# Patient Record
Sex: FEMALE | Race: WHITE | NOT HISPANIC OR LATINO | Employment: FULL TIME | URBAN - METROPOLITAN AREA
[De-identification: names, ages, dates, MRNs, and addresses within clinical notes are randomized per-mention and may not be internally consistent; named-entity substitution may affect disease eponyms.]

---

## 2019-09-24 ENCOUNTER — OFFICE VISIT (OUTPATIENT)
Dept: PSYCHIATRY | Facility: CLINIC | Age: 44
End: 2019-09-24

## 2019-09-24 DIAGNOSIS — F33.1 MODERATE RECURRENT MAJOR DEPRESSION (HCC): Primary | ICD-10-CM

## 2019-09-24 DIAGNOSIS — F41.1 GENERALIZED ANXIETY DISORDER: ICD-10-CM

## 2019-09-24 PROCEDURE — 90792 PSYCH DIAG EVAL W/MED SRVCS: CPT | Performed by: PHYSICIAN ASSISTANT

## 2019-09-24 RX ORDER — METAXALONE 800 MG/1
TABLET ORAL
COMMUNITY
End: 2020-10-05

## 2019-09-24 RX ORDER — METFORMIN HYDROCHLORIDE EXTENDED-RELEASE TABLETS 1000 MG/1
TABLET, FILM COATED, EXTENDED RELEASE ORAL
COMMUNITY
End: 2020-10-05

## 2019-09-24 RX ORDER — VALSARTAN 80 MG/1
TABLET ORAL
COMMUNITY
Start: 2019-09-20 | End: 2020-10-05

## 2019-09-24 RX ORDER — VILAZODONE HYDROCHLORIDE 20 MG/1
TABLET ORAL
COMMUNITY
End: 2019-09-24

## 2019-09-24 RX ORDER — ROSUVASTATIN CALCIUM 10 MG/1
TABLET, COATED ORAL
COMMUNITY
Start: 2019-09-20 | End: 2020-10-05

## 2019-09-24 RX ORDER — ATORVASTATIN CALCIUM 10 MG/1
TABLET, FILM COATED ORAL
COMMUNITY
End: 2019-09-24 | Stop reason: ALTCHOICE

## 2019-09-24 RX ORDER — LOSARTAN POTASSIUM 25 MG/1
TABLET ORAL
COMMUNITY
End: 2019-09-24 | Stop reason: ALTCHOICE

## 2019-09-24 RX ORDER — LORAZEPAM 0.5 MG/1
0.5 TABLET ORAL 2 TIMES DAILY PRN
Qty: 60 TABLET | Refills: 2 | Status: SHIPPED | OUTPATIENT
Start: 2019-09-24 | End: 2019-11-19 | Stop reason: SINTOL

## 2019-09-24 RX ORDER — CLORAZEPATE DIPOTASSIUM 7.5 MG/1
TABLET ORAL
Refills: 0 | COMMUNITY
Start: 2019-08-07 | End: 2019-09-24 | Stop reason: ALTCHOICE

## 2019-09-24 RX ORDER — VORTIOXETINE 20 MG/1
TABLET, FILM COATED ORAL
COMMUNITY
Start: 2019-07-23 | End: 2020-01-02 | Stop reason: SDUPTHER

## 2019-09-24 RX ORDER — SEMAGLUTIDE 1.34 MG/ML
INJECTION, SOLUTION SUBCUTANEOUS
COMMUNITY
Start: 2019-09-20 | End: 2020-10-05

## 2019-09-24 RX ORDER — IBUPROFEN 800 MG/1
TABLET ORAL
COMMUNITY
End: 2020-10-05

## 2019-09-24 NOTE — PROGRESS NOTES
Subjective   Blane Montanez is a 43 y.o.white female who presents today for initial evaluation     Chief Complaint:    Depression, anxiety    History of Present Illness:   Jeanie Saez, her PCP, wanted psych to manage her med, did [employer] testing in March 2019, has her on Trintllex and tranxene  Mom passed away in June, was her caregiver for two years (frontal temporal dementia)  Anxiety as a teen, depression after college  Worries about everything, even though she know she cannot do anything about it  Step-daughter having issues with her Mom who has drug issues, trying to get her into counseling  Panic attack two times a month  No hospitalizations  No SAs  NO SI/HI  No syeda or hypomania  Depression 4/10  Anxiety   Works FT during week,   Has trouble staying focused    The following portions of the patient's history were reviewed and updated as appropriate: allergies, current medications, past family history, past medical history, past social history, past surgical history and problem list.    PAST PSYCHIATRIC HISTORY  Axis I  Affective/Bipoloar Disorder, Anxiety/Panic Disorder  Axis II  None    PAST OUTPATIENT TREATMENT  Diagnosis treated:  Affective Disorder, Anxiety/Panic Disorder  Treatment Type:  Medication Management  Prior Psychiatric Medications:  Viibryd  Lexapro  Tranxene  Trintellix  Support Groups:  None   Sequelae Of Mental Disorder:  social isolation, emotional distress      Interval History  No Change    Side Effects  None      Past Medical History:  Past Medical History:   Diagnosis Date   • Anxiety    • Chronic pain disorder    • Depression    • Panic disorder        Social History:  Social History     Socioeconomic History   • Marital status:      Spouse name: Not on file   • Number of children: Not on file   • Years of education: Not on file   • Highest education level: Not on file   Tobacco Use   • Smoking status: Never Smoker   • Smokeless tobacco: Never Used   Substance  and Sexual Activity   • Alcohol use: Yes     Comment: occasionally   • Drug use: No   • Sexual activity: Yes       Family History:  Family History   Problem Relation Age of Onset   • Anxiety disorder Mother    • Depression Mother    • Anxiety disorder Father    • Depression Father        Past Surgical History:  Past Surgical History:   Procedure Laterality Date   • SINUS SURGERY         Problem List:  There is no problem list on file for this patient.      Allergy:   No Known Allergies     Discontinued Medications:  Medications Discontinued During This Encounter   Medication Reason   • vilazodone (VIIBRYD) 20 MG tablet tablet *Therapy completed   • atorvastatin (LIPITOR) 10 MG tablet Alternate therapy   • losartan (COZAAR) 25 MG tablet Alternate therapy   • clorazepate (TRANXENE) 7.5 MG tablet Alternate therapy       Current Medications:   Current Outpatient Medications   Medication Sig Dispense Refill   • TRINTELLIX 20 MG tablet      • Dapagliflozin Propanediol (FARXIGA) 10 MG tablet Farxiga 10 mg tablet     • Dulaglutide (TRULICITY) 0.75 MG/0.5ML solution pen-injector Trulicity 0.75 mg/0.5 mL subcutaneous pen injector     • exenatide er (BYDUREON) 2 MG pen-injector injection Bydureon 2 mg/0.65 mL subcutaneous pen injector     • ibuprofen (ADVIL,MOTRIN) 800 MG tablet ibuprofen 800 mg tablet     • LORazepam (ATIVAN) 0.5 MG tablet Take 1 tablet by mouth 2 (Two) Times a Day As Needed for Anxiety. 60 tablet 2   • metaxalone (SKELAXIN) 800 MG tablet metaxalone 800 mg tablet     • metFORMIN (FORTAMET) 1000 MG (OSM) 24 hr tablet metformin ER 1,000 mg tablet,extended release 24hr     • OZEMPIC, 1 MG/DOSE, 2 MG/1.5ML solution pen-injector      • rosuvastatin (CRESTOR) 10 MG tablet      • valsartan (DIOVAN) 80 MG tablet        No current facility-administered medications for this visit.          Review of Symptoms:    Psychiatric/Behavioral: Negative for agitation, behavioral problems, confusion, decreased concentration,  dysphoric mood, hallucinations, self-injury, sleep disturbance and suicidal ideas. The patient is not nervous/anxious and is not hyperactive.        Physical Exam:   There were no vitals taken for this visit.    Mental Status Exam:   Hygiene:   good  Cooperation:  Cooperative  Eye Contact:  Good  Psychomotor Behavior:  Appropriate  Affect:  Appropriate  Mood: anxious  Hopelessness: Denies  Speech:  Normal  Thought Process:  Goal directed  Thought Content:  Normal  Suicidal:  None  Homicidal:  None  Hallucinations:  None  Delusion:  None  Memory:  Intact  Orientation:  Person, Place, Time and Situation  Reliability:  good  Insight:  Good  Judgement:  Good  Impulse Control:  Good  Physical/Medical Issues:  No        PHQ-9 Depression Screening  Little interest or pleasure in doing things? 1   Feeling down, depressed, or hopeless? 1   Trouble falling or staying asleep, or sleeping too much? 2   Feeling tired or having little energy? 1   Poor appetite or overeating? 0   Feeling bad about yourself - or that you are a failure or have let yourself or your family down? 1   Trouble concentrating on things, such as reading the newspaper or watching television? 2   Moving or speaking so slowly that other people could have noticed? Or the opposite - being so fidgety or restless that you have been moving around a lot more than usual? 0   Thoughts that you would be better off dead, or of hurting yourself in some way? 0   PHQ-9 Total Score 8   If you checked off any problems, how difficult have these problems made it for you to do your work, take care of things at home, or get along with other people? Somewhat difficult           Never smoker    I advised Blane of the risks of tobacco use.     Lab Results:   No visits with results within 3 Month(s) from this visit.   Latest known visit with results is:   No results found for any previous visit.       Assessment/Plan   Problems Addressed this Visit     None      Visit Diagnoses      Moderate recurrent major depression (CMS/HCC)    -  Primary    Relevant Medications    TRINTELLIX 20 MG tablet    LORazepam (ATIVAN) 0.5 MG tablet    Generalized anxiety disorder        Relevant Medications    TRINTELLIX 20 MG tablet    LORazepam (ATIVAN) 0.5 MG tablet          Visit Diagnoses:    ICD-10-CM ICD-9-CM   1. Moderate recurrent major depression (CMS/HCC) F33.1 296.32   2. Generalized anxiety disorder F41.1 300.02       TREATMENT PLAN/GOALS: Continue supportive psychotherapy efforts and medications as indicated. Treatment and medication options discussed during today's visit. Patient ackowledged and verbally consented to continue with current treatment plan and was educated on the importance of compliance with treatment and follow-up appointments.    MEDICATION ISSUES:  INSPECT reviewed as expected  Discussed medication options and treatment plan of prescribed medication as well as the risks, benefits, and side effects including potential falls, possible impaired driving and metabolic adversities among others. Patient is agreeable to call the office with any worsening of symptoms or onset of side effects. Patient is agreeable to call 911 or go to the nearest ER should he/she begin having SI/HI. No medication side effects or related complaints today.     Patient has a long history of anxiety and depression.  She has been doing well in the Trintellix per [employer].  She thinks the Tranxene is somewhat helpful but gets a headache after taking it.  Change Tranxene to lorazepam 0.5 mg 1 twice daily PRN  Emphasis was put on the patient starting counseling for coping skills and stress management.  She was given a referral list to find a counselor on her insurance.  She did not need a new prescription of Trintellix yet.  She just got it filled and has one more refill remaining.    MEDS ORDERED DURING VISIT:  New Medications Ordered This Visit   Medications   • LORazepam (ATIVAN) 0.5 MG tablet     Sig: Take 1  tablet by mouth 2 (Two) Times a Day As Needed for Anxiety.     Dispense:  60 tablet     Refill:  2       Return in about 3 months (around 12/24/2019).         This document has been electronically signed by Nanda Del Rosario PA-C  September 24, 2019 9:41 AM

## 2019-11-19 ENCOUNTER — TELEPHONE (OUTPATIENT)
Dept: PSYCHIATRY | Facility: CLINIC | Age: 44
End: 2019-11-19

## 2019-11-19 DIAGNOSIS — F41.1 GENERALIZED ANXIETY DISORDER: Primary | ICD-10-CM

## 2019-11-19 RX ORDER — CLONAZEPAM 0.5 MG/1
0.5 TABLET ORAL 2 TIMES DAILY PRN
Qty: 60 TABLET | Refills: 0 | Status: SHIPPED | OUTPATIENT
Start: 2019-11-19 | End: 2020-10-05 | Stop reason: SDUPTHER

## 2019-11-19 NOTE — TELEPHONE ENCOUNTER
Discontinue lorazepam, and will send a prescription of low-dose Klonopin 0.5 mg twice daily as needed instead.  Please call her pharmacy and canceled the remaining refills of the lorazepam.

## 2019-11-20 NOTE — TELEPHONE ENCOUNTER
NOTIFIED PATIENT THAT CLONAZEPAM WAS SENT TO PHARMACY AND CANCELLED REMAINING REFILLS ON LORAZEPAM AT PHARMACY

## 2020-01-02 ENCOUNTER — OFFICE VISIT (OUTPATIENT)
Dept: PSYCHIATRY | Facility: CLINIC | Age: 45
End: 2020-01-02

## 2020-01-02 DIAGNOSIS — F33.1 MODERATE RECURRENT MAJOR DEPRESSION (HCC): Primary | ICD-10-CM

## 2020-01-02 PROCEDURE — 99213 OFFICE O/P EST LOW 20 MIN: CPT | Performed by: PHYSICIAN ASSISTANT

## 2020-01-02 RX ORDER — VORTIOXETINE 20 MG/1
20 TABLET, FILM COATED ORAL DAILY
Qty: 90 TABLET | Refills: 3 | Status: SHIPPED | OUTPATIENT
Start: 2020-01-02 | End: 2021-01-27 | Stop reason: SDUPTHER

## 2020-01-02 NOTE — PROGRESS NOTES
Subjective   Blane Montanez is a 44 y.o.white female who presents today for her first follow up    Chief Complaint:    Depression, anxiety    History of Present Illness:   She still has not gotten into a counselor, all are booked or don't take her insurance, on a waiting list  Going to grief counselor with hospice group once monthly until she can see regular counselor   No panic attacks lately  Lorazepam made her cry mpre, doing better with Klonopin, although it makes her sleepy with full tablet  NO SI/HI  Depression 7/10  Trouble sleeping again, lays in bed worrying about things   gone out of town now working a lot  Anxiety 6/10  Works FT during week,   Has trouble staying focused  [employer] testing in March 2019, has her on Trintllex and tranxene  Mom passed away in June, was her caregiver for two years (frontal temporal dementia)      The following portions of the patient's history were reviewed and updated as appropriate: allergies, current medications, past family history, past medical history, past social history, past surgical history and problem list.    PAST PSYCHIATRIC HISTORY  Axis I  Affective/Bipoloar Disorder, Anxiety/Panic Disorder  Axis II  None    PAST OUTPATIENT TREATMENT  Diagnosis treated:  Affective Disorder, Anxiety/Panic Disorder  Treatment Type:  Medication Management  Prior Psychiatric Medications:  Viibryd  Lexapro  Tranxene, gave her a headache  Lorazepam caused increase in crying   Trintellix  Klonopin  Support Groups:  None   Sequelae Of Mental Disorder:  social isolation, emotional distress      Interval History  No Change    Side Effects  None    Past psychiatric history was reviewed and compared to 9/24/1 visit and appropriate edits were made.    Past Medical History:  Past Medical History:   Diagnosis Date   • Anxiety    • Chronic pain disorder    • Depression    • Panic disorder        Social History:  Social History     Socioeconomic History   • Marital status:       Spouse name: Not on file   • Number of children: Not on file   • Years of education: Not on file   • Highest education level: Not on file   Tobacco Use   • Smoking status: Never Smoker   • Smokeless tobacco: Never Used   Substance and Sexual Activity   • Alcohol use: Yes     Comment: occasionally   • Drug use: No   • Sexual activity: Yes       Family History:  Family History   Problem Relation Age of Onset   • Anxiety disorder Mother    • Depression Mother    • Anxiety disorder Father    • Depression Father        Past Surgical History:  Past Surgical History:   Procedure Laterality Date   • SINUS SURGERY         Problem List:  There is no problem list on file for this patient.      Allergy:   No Known Allergies     Discontinued Medications:  Medications Discontinued During This Encounter   Medication Reason   • TRINTELLIX 20 MG tablet Reorder       Current Medications:   Current Outpatient Medications   Medication Sig Dispense Refill   • clonazePAM (KLONOPIN) 0.5 MG tablet Take 1 tablet by mouth 2 (Two) Times a Day As Needed for Anxiety. 60 tablet 0   • Dapagliflozin Propanediol (FARXIGA) 10 MG tablet Farxiga 10 mg tablet     • Dulaglutide (TRULICITY) 0.75 MG/0.5ML solution pen-injector Trulicity 0.75 mg/0.5 mL subcutaneous pen injector     • exenatide er (BYDUREON) 2 MG pen-injector injection Bydureon 2 mg/0.65 mL subcutaneous pen injector     • ibuprofen (ADVIL,MOTRIN) 800 MG tablet ibuprofen 800 mg tablet     • metaxalone (SKELAXIN) 800 MG tablet metaxalone 800 mg tablet     • metFORMIN (FORTAMET) 1000 MG (OSM) 24 hr tablet metformin ER 1,000 mg tablet,extended release 24hr     • OZEMPIC, 1 MG/DOSE, 2 MG/1.5ML solution pen-injector      • rosuvastatin (CRESTOR) 10 MG tablet      • TRINTELLIX 20 MG tablet Take 20 mg by mouth Daily. 90 tablet 3   • valsartan (DIOVAN) 80 MG tablet        No current facility-administered medications for this visit.          Review of Symptoms:    Psychiatric/Behavioral:  Negative for agitation, behavioral problems, confusion, decreased concentration, dysphoric mood, hallucinations, self-injury, sleep disturbance and suicidal ideas. The patient is not nervous/anxious and is not hyperactive.        Physical Exam:   There were no vitals taken for this visit.    Mental Status Exam:   Hygiene:   good  Cooperation:  Cooperative  Eye Contact:  Good  Psychomotor Behavior:  Appropriate  Affect:  Appropriate  Mood: Tearful  Hopelessness: Denies  Speech:  Normal  Thought Process:  Goal directed  Thought Content:  Normal  Suicidal:  None  Homicidal:  None  Hallucinations:  None  Delusion:  None  Memory:  Intact  Orientation:  Person, Place, Time and Situation  Reliability:  good  Insight:  Good  Judgement:  Good  Impulse Control:  Good  Physical/Medical Issues:  No      Mental status exam was reviewed and compared to 9/24/19 visit and appropriate edits were made.    PHQ-9 Depression Screening  Little interest or pleasure in doing things? 2   Feeling down, depressed, or hopeless? 2   Trouble falling or staying asleep, or sleeping too much? 2   Feeling tired or having little energy? 2   Poor appetite or overeating? 1   Feeling bad about yourself - or that you are a failure or have let yourself or your family down? 1   Trouble concentrating on things, such as reading the newspaper or watching television? 1   Moving or speaking so slowly that other people could have noticed? Or the opposite - being so fidgety or restless that you have been moving around a lot more than usual? 0   Thoughts that you would be better off dead, or of hurting yourself in some way? 0   PHQ-9 Total Score 11   If you checked off any problems, how difficult have these problems made it for you to do your work, take care of things at home, or get along with other people? Very difficult           Never smoker    I advised Blane of the risks of tobacco use.     Lab Results:   No visits with results within 3 Month(s) from this  visit.   Latest known visit with results is:   No results found for any previous visit.       Assessment/Plan   Problems Addressed this Visit     None      Visit Diagnoses     Moderate recurrent major depression (CMS/HCC)    -  Primary    Relevant Medications    TRINTELLIX 20 MG tablet          Visit Diagnoses:    ICD-10-CM ICD-9-CM   1. Moderate recurrent major depression (CMS/HCC) F33.1 296.32       TREATMENT PLAN/GOALS: Continue supportive psychotherapy efforts and medications as indicated. Treatment and medication options discussed during today's visit. Patient ackowledged and verbally consented to continue with current treatment plan and was educated on the importance of compliance with treatment and follow-up appointments.    MEDICATION ISSUES:  INSPECT reviewed as expected  Discussed medication options and treatment plan of prescribed medication as well as the risks, benefits, and side effects including potential falls, possible impaired driving and metabolic adversities among others. Patient is agreeable to call the office with any worsening of symptoms or onset of side effects. Patient is agreeable to call 911 or go to the nearest ER should he/she begin having SI/HI. No medication side effects or related complaints today.     She continues to do well on Trintellix per [employer].  She was switched from Tranxene to lorazepam, but she could not take the lorazepam either because it made her cry more.  She is now taking Klonopin and tolerating it well.  Continue grief counseling with hospice until she can get into regular counselor.    Continue Trintellix 20 mg daily #90 with 3 refills sent to her mail order pharmacy.    Continue Klonopin as needed but no refill needed today.      MEDS ORDERED DURING VISIT:  New Medications Ordered This Visit   Medications   • TRINTELLIX 20 MG tablet     Sig: Take 20 mg by mouth Daily.     Dispense:  90 tablet     Refill:  3       Return in about 3 months (around 4/2/2020).          This document has been electronically signed by Nanda Del Rosario PA-C  January 2, 2020 10:39 AM

## 2020-04-02 ENCOUNTER — TELEMEDICINE (OUTPATIENT)
Dept: PSYCHIATRY | Facility: CLINIC | Age: 45
End: 2020-04-02

## 2020-04-02 DIAGNOSIS — F41.1 GENERALIZED ANXIETY DISORDER: ICD-10-CM

## 2020-04-02 DIAGNOSIS — F33.1 MODERATE RECURRENT MAJOR DEPRESSION (HCC): Primary | ICD-10-CM

## 2020-04-02 PROBLEM — M51.36 DEGENERATION OF LUMBAR INTERVERTEBRAL DISC: Status: ACTIVE | Noted: 2018-05-29

## 2020-04-02 PROBLEM — M47.816 LUMBAR SPONDYLOSIS: Status: ACTIVE | Noted: 2018-04-26

## 2020-04-02 PROBLEM — IMO0002 HERNIATION OF INTERVERTEBRAL DISC: Status: ACTIVE | Noted: 2017-04-18

## 2020-04-02 PROBLEM — M54.50 LOW BACK PAIN: Status: ACTIVE | Noted: 2017-04-18

## 2020-04-02 PROCEDURE — 99213 OFFICE O/P EST LOW 20 MIN: CPT | Performed by: PHYSICIAN ASSISTANT

## 2020-04-02 NOTE — PATIENT INSTRUCTIONS
Living With Anxiety    After being diagnosed with an anxiety disorder, you may be relieved to know why you have felt or behaved a certain way. It is natural to also feel overwhelmed about the treatment ahead and what it will mean for your life. With care and support, you can manage this condition and recover from it.  How to cope with anxiety  Dealing with stress  Stress is your body’s reaction to life changes and events, both good and bad. Stress can last just a few hours or it can be ongoing. Stress can play a major role in anxiety, so it is important to learn both how to cope with stress and how to think about it differently.  Talk with your health care provider or a counselor to learn more about stress reduction. He or she may suggest some stress reduction techniques, such as:  · Music therapy. This can include creating or listening to music that you enjoy and that inspires you.  · Mindfulness-based meditation. This involves being aware of your normal breaths, rather than trying to control your breathing. It can be done while sitting or walking.  · Centering prayer. This is a kind of meditation that involves focusing on a word, phrase, or sacred image that is meaningful to you and that brings you peace.  · Deep breathing. To do this, expand your stomach and inhale slowly through your nose. Hold your breath for 3-5 seconds. Then exhale slowly, allowing your stomach muscles to relax.  · Self-talk. This is a skill where you identify thought patterns that lead to anxiety reactions and correct those thoughts.  · Muscle relaxation. This involves tensing muscles then relaxing them.  Choose a stress reduction technique that fits your lifestyle and personality. Stress reduction techniques take time and practice. Set aside 5-15 minutes a day to do them. Therapists can offer training in these techniques. The training may be covered by some insurance plans. Other things you can do to manage stress include:  · Keeping a  stress diary. This can help you learn what triggers your stress and ways to control your response.  · Thinking about how you respond to certain situations. You may not be able to control everything, but you can control your reaction.  · Making time for activities that help you relax, and not feeling guilty about spending your time in this way.  Therapy combined with coping and stress-reduction skills provides the best chance for successful treatment.  Medicines  Medicines can help ease symptoms. Medicines for anxiety include:  · Anti-anxiety drugs.  · Antidepressants.  · Beta-blockers.  Medicines may be used as the main treatment for anxiety disorder, along with therapy, or if other treatments are not working. Medicines should be prescribed by a health care provider.  Relationships  Relationships can play a big part in helping you recover. Try to spend more time connecting with trusted friends and family members. Consider going to couples counseling, taking family education classes, or going to family therapy. Therapy can help you and others better understand the condition.  How to recognize changes in your condition  Everyone has a different response to treatment for anxiety. Recovery from anxiety happens when symptoms decrease and stop interfering with your daily activities at home or work. This may mean that you will start to:  · Have better concentration and focus.  · Sleep better.  · Be less irritable.  · Have more energy.  · Have improved memory.  It is important to recognize when your condition is getting worse. Contact your health care provider if your symptoms interfere with home or work and you do not feel like your condition is improving.  Where to find help and support:  You can get help and support from these sources:  · Self-help groups.  · Online and community organizations.  · A trusted spiritual leader.  · Couples counseling.  · Family education classes.  · Family therapy.  Follow these instructions  at home:  · Eat a healthy diet that includes plenty of vegetables, fruits, whole grains, low-fat dairy products, and lean protein. Do not eat a lot of foods that are high in solid fats, added sugars, or salt.  · Exercise. Most adults should do the following:  ? Exercise for at least 150 minutes each week. The exercise should increase your heart rate and make you sweat (moderate-intensity exercise).  ? Strengthening exercises at least twice a week.  · Cut down on caffeine, tobacco, alcohol, and other potentially harmful substances.  · Get the right amount and quality of sleep. Most adults need 7-9 hours of sleep each night.  · Make choices that simplify your life.  · Take over-the-counter and prescription medicines only as told by your health care provider.  · Avoid caffeine, alcohol, and certain over-the-counter cold medicines. These may make you feel worse. Ask your pharmacist which medicines to avoid.  · Keep all follow-up visits as told by your health care provider. This is important.  Questions to ask your health care provider  · Would I benefit from therapy?  · How often should I follow up with a health care provider?  · How long do I need to take medicine?  · Are there any long-term side effects of my medicine?  · Are there any alternatives to taking medicine?  Contact a health care provider if:  · You have a hard time staying focused or finishing daily tasks.  · You spend many hours a day feeling worried about everyday life.  · You become exhausted by worry.  · You start to have headaches, feel tense, or have nausea.  · You urinate more than normal.  · You have diarrhea.  Get help right away if:  · You have a racing heart and shortness of breath.  · You have thoughts of hurting yourself or others.  If you ever feel like you may hurt yourself or others, or have thoughts about taking your own life, get help right away. You can go to your nearest emergency department or call:  · Your local emergency services  (911 in the [place]  · A suicide crisis helpline, such as the National Suicide Prevention Lifeline at 1-128.875.9285. This is open 24-hours a day.  Summary  · Taking steps to deal with stress can help calm you.  · Medicines cannot cure anxiety disorders, but they can help ease symptoms.  · Family, friends, and partners can play a big part in helping you recover from an anxiety disorder.  This information is not intended to replace advice given to you by your health care provider. Make sure you discuss any questions you have with your health care provider.  Document Released: 12/12/2017 Document Revised: 12/12/2017 Document Reviewed: 12/12/2017  [employer] Interactive Patient Education © 2020 [employer]

## 2020-04-02 NOTE — PROGRESS NOTES
"Subjective   Subjective   Blane Montanez is a 44 y.o.white female who presents today for her follow up via video visit.    Chief Complaint:    Depression, anxiety    History of Present Illness:   Patient reports doing very well  She is still working \"essential\"  No panic attack, had a couple of nights she could not sleep  Tried grief group but it was \"awful\"  Seeing a \"\" and helping her a lot  Depression 1/10  Denies Si/HI  Anxiety 2/10  Works FT during week,     The following portions of the patient's history were reviewed and updated as appropriate: allergies, current medications, past family history, past medical history, past social history, past surgical history and problem list.    PAST PSYCHIATRIC HISTORY  Axis I  Affective/Bipoloar Disorder, Anxiety/Panic Disorder  Axis II  None    PAST OUTPATIENT TREATMENT  Diagnosis treated:  Affective Disorder, Anxiety/Panic Disorder  Treatment Type:  Medication Management  Prior Psychiatric Medications:  Viibryd  Lexapro  Tranxene, gave her a headache  Lorazepam caused increase in crying   Trintellix  Klonopin  Melatonin give her nightmares  Support Groups:  None   Sequelae Of Mental Disorder:  social isolation, emotional distress      Interval History  No Change    Side Effects  None    Past psychiatric history was reviewed and compared to 1/2/20 visit and appropriate updates were made.    Past Medical History:  Past Medical History:   Diagnosis Date   • Anxiety    • Chronic pain disorder    • Depression    • Panic disorder        Social History:  Social History     Socioeconomic History   • Marital status:      Spouse name: Not on file   • Number of children: Not on file   • Years of education: Not on file   • Highest education level: Not on file   Tobacco Use   • Smoking status: Never Smoker   • Smokeless tobacco: Never Used   Substance and Sexual Activity   • Alcohol use: Yes     Alcohol/week: 1.0 standard drinks     Types: 1 Cans of beer " per week     Comment: occasionally   • Drug use: Never   • Sexual activity: Yes     Partners: Male     Birth control/protection: None       Family History:  Family History   Problem Relation Age of Onset   • Anxiety disorder Mother    • Depression Mother    • Anxiety disorder Father    • Depression Father    • Alcohol abuse Father    • Alcohol abuse Maternal Grandfather    • Anxiety disorder Maternal Grandmother    • Depression Maternal Grandmother        Past Surgical History:  Past Surgical History:   Procedure Laterality Date   • COLONOSCOPY     • SINUS SURGERY         Problem List:  Patient Active Problem List   Diagnosis   • Degeneration of lumbar intervertebral disc   • Herniation of intervertebral disc   • Low back pain   • Lumbar spondylosis       Allergy:   No Known Allergies     Discontinued Medications:  There are no discontinued medications.    Current Medications:   Current Outpatient Medications   Medication Sig Dispense Refill   • clonazePAM (KLONOPIN) 0.5 MG tablet Take 1 tablet by mouth 2 (Two) Times a Day As Needed for Anxiety. 60 tablet 0   • Dapagliflozin Propanediol (FARXIGA) 10 MG tablet Farxiga 10 mg tablet     • Dulaglutide (TRULICITY) 0.75 MG/0.5ML solution pen-injector Trulicity 0.75 mg/0.5 mL subcutaneous pen injector     • exenatide er (BYDUREON) 2 MG pen-injector injection Bydureon 2 mg/0.65 mL subcutaneous pen injector     • ibuprofen (ADVIL,MOTRIN) 800 MG tablet ibuprofen 800 mg tablet     • metaxalone (SKELAXIN) 800 MG tablet metaxalone 800 mg tablet     • metFORMIN (FORTAMET) 1000 MG (OSM) 24 hr tablet metformin ER 1,000 mg tablet,extended release 24hr     • OZEMPIC, 1 MG/DOSE, 2 MG/1.5ML solution pen-injector      • rosuvastatin (CRESTOR) 10 MG tablet      • TRINTELLIX 20 MG tablet Take 20 mg by mouth Daily. 90 tablet 3   • valsartan (DIOVAN) 80 MG tablet        No current facility-administered medications for this visit.          Review of Symptoms:    Psychiatric/Behavioral:  Negative for agitation, behavioral problems, confusion, decreased concentration, dysphoric mood, hallucinations, self-injury, sleep disturbance and suicidal ideas. The patient is not nervous/anxious and is not hyperactive.        Physical Exam:   There were no vitals taken for this visit.    Mental Status Exam:   Hygiene:   good  Cooperation:  Cooperative  Eye Contact:  Good  Psychomotor Behavior:  Appropriate  Affect:  Appropriate  Mood: Tearful  Hopelessness: Denies  Speech:  Normal  Thought Process:  Goal directed  Thought Content:  Normal  Suicidal:  None  Homicidal:  None  Hallucinations:  None  Delusion:  None  Memory:  Intact  Orientation:  Person, Place, Time and Situation  Reliability:  good  Insight:  Good  Judgement:  Good  Impulse Control:  Good  Physical/Medical Issues:  No      Mental status exam was reviewed and compared to 1/2/20visit and no changes were necessary, the exam was the same.    PHQ-9 Depression Screening  Little interest or pleasure in doing things?  0   Feeling down, depressed, or hopeless?  1   Trouble falling or staying asleep, or sleeping too much?     Feeling tired or having little energy?     Poor appetite or overeating?     Feeling bad about yourself - or that you are a failure or have let yourself or your family down?     Trouble concentrating on things, such as reading the newspaper or watching television?     Moving or speaking so slowly that other people could have noticed? Or the opposite - being so fidgety or restless that you have been moving around a lot more than usual?     Thoughts that you would be better off dead, or of hurting yourself in some way?     PHQ-9 Total Score  1   If you checked off any problems, how difficult have these problems made it for you to do your work, take care of things at home, or get along with other people?  Not difficult at all           Never smoker    I advised Blane of the risks of tobacco use.     Lab Results:   No visits with results  within 3 Month(s) from this visit.   Latest known visit with results is:   No results found for any previous visit.       Assessment/Plan   Problems Addressed this Visit     None      Visit Diagnoses     Moderate recurrent major depression (CMS/HCC)    -  Primary    Generalized anxiety disorder              Visit Diagnoses:    ICD-10-CM ICD-9-CM   1. Moderate recurrent major depression (CMS/HCC) F33.1 296.32   2. Generalized anxiety disorder F41.1 300.02       TREATMENT PLAN/GOALS: Continue supportive psychotherapy efforts and medications as indicated. Treatment and medication options discussed during today's visit. Patient ackowledged and verbally consented to continue with current treatment plan and was educated on the importance of compliance with treatment and follow-up appointments.    MEDICATION ISSUES:  INSPECT reviewed as expected  Discussed medication options and treatment plan of prescribed medication as well as the risks, benefits, and side effects including potential falls, possible impaired driving and metabolic adversities among others. Patient is agreeable to call the office with any worsening of symptoms or onset of side effects. Patient is agreeable to call 911 or go to the nearest ER should he/she begin having SI/HI. No medication side effects or related complaints today.     Patient is doing great, low depression and anxiety with Trintellix, and rarely takes Klonopin.  Continue Trintellix and Klonopin at current dosages but no refills needed today.     MEDS ORDERED DURING VISIT:  No orders of the defined types were placed in this encounter.      Return in about 6 months (around 10/2/2020).         This document has been electronically signed by Nanda Del Rosario PA-C

## 2020-10-05 ENCOUNTER — TELEMEDICINE (OUTPATIENT)
Dept: PSYCHIATRY | Facility: CLINIC | Age: 45
End: 2020-10-05

## 2020-10-05 DIAGNOSIS — F41.1 GENERALIZED ANXIETY DISORDER: ICD-10-CM

## 2020-10-05 DIAGNOSIS — F33.1 MODERATE RECURRENT MAJOR DEPRESSION (HCC): Primary | ICD-10-CM

## 2020-10-05 PROCEDURE — 99213 OFFICE O/P EST LOW 20 MIN: CPT | Performed by: PHYSICIAN ASSISTANT

## 2020-10-05 RX ORDER — CLONAZEPAM 0.5 MG/1
0.5 TABLET ORAL 2 TIMES DAILY PRN
Qty: 60 TABLET | Refills: 2 | Status: SHIPPED | OUTPATIENT
Start: 2020-10-05 | End: 2021-01-27 | Stop reason: SDUPTHER

## 2020-10-05 NOTE — PROGRESS NOTES
Subjective   Subjective   Blane Montanez is a 44 y.o.white female who presents today for her follow up via video visit x 15 minutes    Chief Complaint:    Depression, anxiety    History of Present Illness:   Going through her Mom's things, passed 1.5 yrs ago, having a hard time with it.  18 yr old Step-daughter packed up and moved out last night without telling them, moved to her grandma, so she is struggling today  She had stopped taking the Klonopin for a while but started using it again the last few days  Still sees her  about once a month  She still thinks her medications are working well  Depression 3/10  Denies Si/HI  Anxiety 4/10  Works FT during week, , but has been is laid off.    The following portions of the patient's history were reviewed and updated as appropriate: allergies, current medications, past family history, past medical history, past social history, past surgical history and problem list.    PAST PSYCHIATRIC HISTORY  Axis I  Affective/Bipoloar Disorder, Anxiety/Panic Disorder  Axis II  None    PAST OUTPATIENT TREATMENT  Diagnosis treated:  Affective Disorder, Anxiety/Panic Disorder  Treatment Type:  Medication Management  Prior Psychiatric Medications:  Viibryd  Lexapro  Tranxene, gave her a headache  Lorazepam caused increase in crying   Trintellix  Klonopin  Melatonin give her nightmares  Support Groups:  None   Sequelae Of Mental Disorder:  social isolation, emotional distress      Interval History  No Change    Side Effects  None    Past psychiatric history was reviewed and compared to 4/2/20 visit and no updates were needed..    Past Medical History:  Past Medical History:   Diagnosis Date   • Anxiety    • Chronic pain disorder    • Depression    • Panic disorder        Social History:  Social History     Socioeconomic History   • Marital status:      Spouse name: Not on file   • Number of children: Not on file   • Years of education: Not on file   • Highest  education level: Not on file   Tobacco Use   • Smoking status: Never Smoker   • Smokeless tobacco: Never Used   Substance and Sexual Activity   • Alcohol use: Yes     Alcohol/week: 1.0 standard drinks     Types: 1 Cans of beer per week     Comment: occasionally   • Drug use: Never   • Sexual activity: Yes     Partners: Male     Birth control/protection: None       Family History:  Family History   Problem Relation Age of Onset   • Anxiety disorder Mother    • Depression Mother    • Anxiety disorder Father    • Depression Father    • Alcohol abuse Father    • Alcohol abuse Maternal Grandfather    • Anxiety disorder Maternal Grandmother    • Depression Maternal Grandmother        Past Surgical History:  Past Surgical History:   Procedure Laterality Date   • COLONOSCOPY     • SINUS SURGERY         Problem List:  Patient Active Problem List   Diagnosis   • Degeneration of lumbar intervertebral disc   • Herniation of intervertebral disc   • Low back pain   • Lumbar spondylosis       Allergy:   No Known Allergies     Discontinued Medications:  Medications Discontinued During This Encounter   Medication Reason   • Dapagliflozin Propanediol (FARXIGA) 10 MG tablet *Therapy completed   • Dulaglutide (TRULICITY) 0.75 MG/0.5ML solution pen-injector *Therapy completed   • ibuprofen (ADVIL,MOTRIN) 800 MG tablet *Therapy completed   • exenatide er (BYDUREON) 2 MG pen-injector injection *Therapy completed   • metaxalone (SKELAXIN) 800 MG tablet *Therapy completed   • metFORMIN (FORTAMET) 1000 MG (OSM) 24 hr tablet *Therapy completed   • OZEMPIC, 1 MG/DOSE, 2 MG/1.5ML solution pen-injector *Therapy completed   • rosuvastatin (CRESTOR) 10 MG tablet *Therapy completed   • valsartan (DIOVAN) 80 MG tablet    • clonazePAM (KLONOPIN) 0.5 MG tablet Reorder       Current Medications:   Current Outpatient Medications   Medication Sig Dispense Refill   • clonazePAM (KlonoPIN) 0.5 MG tablet Take 1 tablet by mouth 2 (Two) Times a Day As Needed  for Anxiety. 60 tablet 2   • TRINTELLIX 20 MG tablet Take 20 mg by mouth Daily. 90 tablet 3     No current facility-administered medications for this visit.          Review of Symptoms:    Psychiatric/Behavioral: Negative for agitation, behavioral problems, confusion, decreased concentration, dysphoric mood, hallucinations, self-injury, sleep disturbance and suicidal ideas. The patient is nervous/anxious and is not hyperactive.        Physical Exam:   There were no vitals taken for this visit.    Mental Status Exam:   Hygiene:   good  Cooperation:  Cooperative  Eye Contact:  Good  Psychomotor Behavior:  Appropriate  Affect:  Appropriate  Mood: Tearful today, mildly anxious  Hopelessness: Denies  Speech:  Normal  Thought Process:  Goal directed  Thought Content:  Normal  Suicidal:  None  Homicidal:  None  Hallucinations:  None  Delusion:  None  Memory:  Intact  Orientation:  Person, Place, Time and Situation  Reliability:  good  Insight:  Good  Judgement:  Good  Impulse Control:  Good  Physical/Medical Issues:  No      Mental status exam was reviewed and compared to 4/2/20 visit and appropriate updates were made.    PHQ-9 Depression Screening  Little interest or pleasure in doing things? 1   Feeling down, depressed, or hopeless? 1   Trouble falling or staying asleep, or sleeping too much? 1   Feeling tired or having little energy? 1   Poor appetite or overeating? 0   Feeling bad about yourself - or that you are a failure or have let yourself or your family down? 0   Trouble concentrating on things, such as reading the newspaper or watching television? 0   Moving or speaking so slowly that other people could have noticed? Or the opposite - being so fidgety or restless that you have been moving around a lot more than usual? 0   Thoughts that you would be better off dead, or of hurting yourself in some way? 0   PHQ-9 Total Score 4   If you checked off any problems, how difficult have these problems made it for you to do  your work, take care of things at home, or get along with other people? Somewhat difficult           Never smoker    I advised Blane of the risks of tobacco use.     Lab Results:   No visits with results within 3 Month(s) from this visit.   Latest known visit with results is:   No results found for any previous visit.       Assessment/Plan   Problems Addressed this Visit     None      Visit Diagnoses     Moderate recurrent major depression (CMS/HCC)    -  Primary    Generalized anxiety disorder        Relevant Medications    clonazePAM (KlonoPIN) 0.5 MG tablet      Diagnoses       Codes Comments    Moderate recurrent major depression (CMS/HCC)    -  Primary ICD-10-CM: F33.1  ICD-9-CM: 296.32     Generalized anxiety disorder     ICD-10-CM: F41.1  ICD-9-CM: 300.02           Visit Diagnoses:    ICD-10-CM ICD-9-CM   1. Moderate recurrent major depression (CMS/HCC)  F33.1 296.32   2. Generalized anxiety disorder  F41.1 300.02       TREATMENT PLAN/GOALS: Continue supportive psychotherapy efforts and medications as indicated. Treatment and medication options discussed during today's visit. Patient ackowledged and verbally consented to continue with current treatment plan and was educated on the importance of compliance with treatment and follow-up appointments.    MEDICATION ISSUES:  INSPECT reviewed as expected  Discussed medication options and treatment plan of prescribed medication as well as the risks, benefits, and side effects including potential falls, possible impaired driving and metabolic adversities among others. Patient is agreeable to call the office with any worsening of symptoms or onset of side effects. Patient is agreeable to call 911 or go to the nearest ER should he/she begin having SI/HI. No medication side effects or related complaints today.     Patient has been struggling over the last few weeks with some situational anxiety, grief, but no need for medication changes.  She is doing well on the  Trintellix and Klonopin.  Refill Klonopin 0.5 mg twice daily as needed  Continue Trintellix 20 mg daily but no refill needed.    MEDS ORDERED DURING VISIT:  New Medications Ordered This Visit   Medications   • clonazePAM (KlonoPIN) 0.5 MG tablet     Sig: Take 1 tablet by mouth 2 (Two) Times a Day As Needed for Anxiety.     Dispense:  60 tablet     Refill:  2       Return in about 4 months (around 2/5/2021).         This document has been electronically signed by Nanda Del Roasrio PA-C

## 2020-10-05 NOTE — PATIENT INSTRUCTIONS
Generalized Anxiety Disorder, Adult  Generalized anxiety disorder (ASHVIN) is a mental health disorder. People with this condition constantly worry about everyday events. Unlike normal anxiety, worry related to ASHVIN is not triggered by a specific event. These worries also do not fade or get better with time. ASHVIN interferes with life functions, including relationships, work, and school.  ASHVIN can vary from mild to severe. People with severe ASHVIN can have intense waves of anxiety with physical symptoms (panic attacks).  What are the causes?  The exact cause of ASHVIN is not known.  What increases the risk?  This condition is more likely to develop in:  · Women.  · People who have a family history of anxiety disorders.  · People who are very shy.  · People who experience very stressful life events, such as the death of a loved one.  · People who have a very stressful family environment.  What are the signs or symptoms?  People with ASHVIN often worry excessively about many things in their lives, such as their health and family. They may also be overly concerned about:  · Doing well at work.  · Being on time.  · Natural disasters.  · Friendships.  Physical symptoms of ASHVIN include:  · Fatigue.  · Muscle tension or having muscle twitches.  · Trembling or feeling shaky.  · Being easily startled.  · Feeling like your heart is pounding or racing.  · Feeling out of breath or like you cannot take a deep breath.  · Having trouble falling asleep or staying asleep.  · Sweating.  · Nausea, diarrhea, or irritable bowel syndrome (IBS).  · Headaches.  · Trouble concentrating or remembering facts.  · Restlessness.  · Irritability.  How is this diagnosed?  Your health care provider can diagnose ASHVIN based on your symptoms and medical history. You will also have a physical exam. The health care provider will ask specific questions about your symptoms, including how severe they are, when they started, and if they come and go. Your health care  provider may ask you about your use of alcohol or drugs, including prescription medicines. Your health care provider may refer you to a mental health specialist for further evaluation.  Your health care provider will do a thorough examination and may perform additional tests to rule out other possible causes of your symptoms.  To be diagnosed with ASHVIN, a person must have anxiety that:  · Is out of his or her control.  · Affects several different aspects of his or her life, such as work and relationships.  · Causes distress that makes him or her unable to take part in normal activities.  · Includes at least three physical symptoms of ASHVIN, such as restlessness, fatigue, trouble concentrating, irritability, muscle tension, or sleep problems.  Before your health care provider can confirm a diagnosis of ASHVIN, these symptoms must be present more days than they are not, and they must last for six months or longer.  How is this treated?  The following therapies are usually used to treat ASHVIN:  · Medicine. Antidepressant medicine is usually prescribed for long-term daily control. Antianxiety medicines may be added in severe cases, especially when panic attacks occur.  · Talk therapy (psychotherapy). Certain types of talk therapy can be helpful in treating ASHVIN by providing support, education, and guidance. Options include:  ? Cognitive behavioral therapy (CBT). People learn coping skills and techniques to ease their anxiety. They learn to identify unrealistic or negative thoughts and behaviors and to replace them with positive ones.  ? Acceptance and commitment therapy (ACT). This treatment teaches people how to be mindful as a way to cope with unwanted thoughts and feelings.  ? Biofeedback. This process trains you to manage your body's response (physiological response) through breathing techniques and relaxation methods. You will work with a therapist while machines are used to monitor your physical symptoms.  · Stress  management techniques. These include yoga, meditation, and exercise.  A mental health specialist can help determine which treatment is best for you. Some people see improvement with one type of therapy. However, other people require a combination of therapies.  Follow these instructions at home:  · Take over-the-counter and prescription medicines only as told by your health care provider.  · Try to maintain a normal routine.  · Try to anticipate stressful situations and allow extra time to manage them.  · Practice any stress management or self-calming techniques as taught by your health care provider.  · Do not punish yourself for setbacks or for not making progress.  · Try to recognize your accomplishments, even if they are small.  · Keep all follow-up visits as told by your health care provider. This is important.  Contact a health care provider if:  · Your symptoms do not get better.  · Your symptoms get worse.  · You have signs of depression, such as:  ? A persistently sad, cranky, or irritable mood.  ? Loss of enjoyment in activities that used to bring you reshma.  ? Change in weight or eating.  ? Changes in sleeping habits.  ? Avoiding friends or family members.  ? Loss of energy for normal tasks.  ? Feelings of guilt or worthlessness.  Get help right away if:  · You have serious thoughts about hurting yourself or others.  If you ever feel like you may hurt yourself or others, or have thoughts about taking your own life, get help right away. You can go to your nearest emergency department or call:  · Your local emergency services (911 in the [place]  · A suicide crisis helpline, such as the National Suicide Prevention Lifeline at 1-366.217.8051. This is open 24 hours a day.  Summary  · Generalized anxiety disorder (ASHVIN) is a mental health disorder that involves worry that is not triggered by a specific event.  · People with ASHVIN often worry excessively about many things in their lives, such as their health and  family.  · ASHVIN may cause physical symptoms such as restlessness, trouble concentrating, sleep problems, frequent sweating, nausea, diarrhea, headaches, and trembling or muscle twitching.  · A mental health specialist can help determine which treatment is best for you. Some people see improvement with one type of therapy. However, other people require a combination of therapies.  This information is not intended to replace advice given to you by your health care provider. Make sure you discuss any questions you have with your health care provider.  Document Released: 04/14/2014 Document Revised: 11/30/2018 Document Reviewed: 11/07/2017  [employer] Patient Education © 2020 [employer]

## 2021-01-27 ENCOUNTER — OFFICE VISIT (OUTPATIENT)
Dept: PSYCHIATRY | Facility: CLINIC | Age: 46
End: 2021-01-27

## 2021-01-27 DIAGNOSIS — F41.1 GENERALIZED ANXIETY DISORDER: Chronic | ICD-10-CM

## 2021-01-27 DIAGNOSIS — F33.1 MODERATE RECURRENT MAJOR DEPRESSION (HCC): ICD-10-CM

## 2021-01-27 DIAGNOSIS — F41.0 PANIC DISORDER: Primary | Chronic | ICD-10-CM

## 2021-01-27 PROCEDURE — 99214 OFFICE O/P EST MOD 30 MIN: CPT | Performed by: PHYSICIAN ASSISTANT

## 2021-01-27 RX ORDER — VORTIOXETINE 20 MG/1
20 TABLET, FILM COATED ORAL DAILY
Qty: 90 TABLET | Refills: 3 | Status: SHIPPED | OUTPATIENT
Start: 2021-01-27

## 2021-01-27 RX ORDER — CLONAZEPAM 0.5 MG/1
0.5 TABLET ORAL 2 TIMES DAILY PRN
Qty: 60 TABLET | Refills: 2 | Status: SHIPPED | OUTPATIENT
Start: 2021-01-27 | End: 2021-11-16 | Stop reason: SDUPTHER

## 2021-11-16 ENCOUNTER — TELEPHONE (OUTPATIENT)
Dept: PSYCHIATRY | Facility: CLINIC | Age: 46
End: 2021-11-16

## 2021-11-16 ENCOUNTER — OFFICE VISIT (OUTPATIENT)
Dept: PSYCHIATRY | Facility: CLINIC | Age: 46
End: 2021-11-16

## 2021-11-16 DIAGNOSIS — F33.0 MDD (MAJOR DEPRESSIVE DISORDER), RECURRENT EPISODE, MILD (HCC): Primary | Chronic | ICD-10-CM

## 2021-11-16 DIAGNOSIS — F41.1 GENERALIZED ANXIETY DISORDER: Chronic | ICD-10-CM

## 2021-11-16 DIAGNOSIS — F41.0 PANIC DISORDER: Chronic | ICD-10-CM

## 2021-11-16 PROCEDURE — 99214 OFFICE O/P EST MOD 30 MIN: CPT | Performed by: PHYSICIAN ASSISTANT

## 2021-11-16 RX ORDER — CLONAZEPAM 0.5 MG/1
0.5 TABLET ORAL 2 TIMES DAILY PRN
Qty: 60 TABLET | Refills: 2 | Status: SHIPPED | OUTPATIENT
Start: 2021-11-16 | End: 2021-11-17 | Stop reason: SDUPTHER

## 2021-11-16 NOTE — PROGRESS NOTES
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 but now hiring people back, missed her last two appt because people called in at work  She had stopped taking the Klonopin for a while but could use it again, step-daughter has moved back in  No panic attacks and had been sleeping better  Still sees her  about once a month  She still thinks her medications are working well  Depression 3/10, this time of year is harder b/c Mom and grandma  this time of year  Denies Si/HI  Anxiety 3/10, no Klonopin since summer though  Works FT during week    The following portions of the patient's history were reviewed and updated as appropriate: allergies, current medications, past family history, past medical history, past social history, past surgical history and problem list.    PAST PSYCHIATRIC HISTORY  Axis I  Affective/Bipoloar Disorder, Anxiety/Panic Disorder  Axis II  None    PAST OUTPATIENT TREATMENT  Diagnosis treated:  Affective Disorder, Anxiety/Panic Disorder  Treatment Type:  Medication Management  Genesight testing done 2019 by another provider  Prior Psychiatric Medications:  Viibryd  Lexapro  Tranxene, gave her a headache  Lorazepam caused increase in crying   Trintellix  Klonopin  Melatonin give her nightmares  Support Groups:  None   Sequelae Of Mental Disorder:  social isolation, emotional distress      Interval History  No Change    Side Effects  None    Past psychiatric history was reviewed and compared to 21 visit and no updates were needed..    Past Medical History:  Past Medical History:   Diagnosis Date   • Anxiety    • Chronic pain disorder    • Depression    • Panic disorder        Social History:  Social History     Socioeconomic History   • Marital status:    Tobacco Use   • Smoking status: Never Smoker    • Smokeless tobacco: Never Used   Substance and Sexual Activity   • Alcohol use: Yes     Alcohol/week: 1.0 standard drink     Types: 1 Cans of beer per week     Comment: occasionally   • Drug use: Never   • Sexual activity: Yes     Partners: Male     Birth control/protection: None       Family History:  Family History   Problem Relation Age of Onset   • Anxiety disorder Mother    • Depression Mother    • Anxiety disorder Father    • Depression Father    • Alcohol abuse Father    • Alcohol abuse Maternal Grandfather    • Anxiety disorder Maternal Grandmother    • Depression Maternal Grandmother        Past Surgical History:  Past Surgical History:   Procedure Laterality Date   • COLONOSCOPY     • SINUS SURGERY         Problem List:  Patient Active Problem List   Diagnosis   • Degeneration of lumbar intervertebral disc   • Herniation of intervertebral disc   • Low back pain   • Lumbar spondylosis   • Panic disorder       Allergy:   No Known Allergies     Discontinued Medications:  Medications Discontinued During This Encounter   Medication Reason   • clonazePAM (KlonoPIN) 0.5 MG tablet Reorder       Current Medications:   Current Outpatient Medications   Medication Sig Dispense Refill   • clonazePAM (KlonoPIN) 0.5 MG tablet Take 1 tablet by mouth 2 (Two) Times a Day As Needed for Anxiety. 60 tablet 2   • Trintellix 20 MG tablet Take 20 mg by mouth Daily. 90 tablet 3     No current facility-administered medications for this visit.         Review of Symptoms:    Psychiatric/Behavioral: Negative for agitation, behavioral problems, confusion, decreased concentration, dysphoric mood, hallucinations, self-injury, sleep disturbance and suicidal ideas. The patient is nervous/anxious and is not hyperactive.        Physical Exam:   There were no vitals taken for this visit.    Mental Status Exam:   Hygiene:   good  Cooperation:  Cooperative  Eye Contact:  Good  Psychomotor Behavior:  Appropriate  Affect:  Appropriate  Mood:  Tearful today, mildly anxious  Hopelessness: Denies  Speech:  Normal  Thought Process:  Goal directed  Thought Content:  Normal  Suicidal:  None  Homicidal:  None  Hallucinations:  None  Delusion:  None  Memory:  Intact  Orientation:  Person, Place, Time and Situation  Reliability:  good  Insight:  Good  Judgement:  Good  Impulse Control:  Good  Physical/Medical Issues:  No      Mental status exam was reviewed and compared to 1/27/21 visit and appropriate updates were made.    PHQ-9 Depression Screening  Little interest or pleasure in doing things? 1   Feeling down, depressed, or hopeless? 1   Trouble falling or staying asleep, or sleeping too much? 1   Feeling tired or having little energy? 1   Poor appetite or overeating? 0   Feeling bad about yourself - or that you are a failure or have let yourself or your family down? 0   Trouble concentrating on things, such as reading the newspaper or watching television? 0   Moving or speaking so slowly that other people could have noticed? Or the opposite - being so fidgety or restless that you have been moving around a lot more than usual? 0   Thoughts that you would be better off dead, or of hurting yourself in some way? 0   PHQ-9 Total Score 4   If you checked off any problems, how difficult have these problems made it for you to do your work, take care of things at home, or get along with other people? Not difficult at all           Never smoker    I advised Blane of the risks of tobacco use.     Lab Results:   No visits with results within 3 Month(s) from this visit.   Latest known visit with results is:   No results found for any previous visit.       Assessment/Plan   Problems Addressed this Visit        Mental Health    Panic disorder (Chronic)      Other Visit Diagnoses     MDD (major depressive disorder), recurrent episode, mild (HCC)  (Chronic)   -  Primary    Generalized anxiety disorder  (Chronic)       Relevant Medications    clonazePAM (KlonoPIN) 0.5 MG  tablet      Diagnoses       Codes Comments    MDD (major depressive disorder), recurrent episode, mild (HCC)    -  Primary ICD-10-CM: F33.0  ICD-9-CM: 296.31     Generalized anxiety disorder     ICD-10-CM: F41.1  ICD-9-CM: 300.02     Panic disorder     ICD-10-CM: F41.0  ICD-9-CM: 300.01           Visit Diagnoses:    ICD-10-CM ICD-9-CM   1. MDD (major depressive disorder), recurrent episode, mild (HCC)  F33.0 296.31   2. Generalized anxiety disorder  F41.1 300.02   3. Panic disorder  F41.0 300.01       TREATMENT PLAN/GOALS: Continue supportive psychotherapy efforts and medications as indicated. Treatment and medication options discussed during today's visit. Patient ackowledged and verbally consented to continue with current treatment plan and was educated on the importance of compliance with treatment and follow-up appointments.    MEDICATION ISSUES:  INSPECT reviewed as expected  Discussed medication options and treatment plan of prescribed medication as well as the risks, benefits, and side effects including potential falls, possible impaired driving and metabolic adversities among others. Patient is agreeable to call the office with any worsening of symptoms or onset of side effects. Patient is agreeable to call 911 or go to the nearest ER should he/she begin having SI/HI. No medication side effects or related complaints today.     Patient has been doing very well, anxiety and depression both stable, has only needed the klonopin PRN, last filled Jan 2021 per [facility]  Refill Klonopin 0.5 mg twice daily as needed for anxiety  Continue Trintellix 20 mg daily for depression    MEDS ORDERED DURING VISIT:  New Medications Ordered This Visit   Medications   • clonazePAM (KlonoPIN) 0.5 MG tablet     Sig: Take 1 tablet by mouth 2 (Two) Times a Day As Needed for Anxiety.     Dispense:  60 tablet     Refill:  2       Return in about 1 year (around 11/16/2022).         This document has been electronically signed by Nanda PARRISH  JOSE Del Rosario

## 2021-11-16 NOTE — TELEPHONE ENCOUNTER
Clonazepam- [facility] fill it due to meeting their JB limit - they cancelled rx. Left voice message for patient to basia me back to find out what pharmacy.   94

## 2021-11-17 RX ORDER — CLONAZEPAM 0.5 MG/1
0.5 TABLET ORAL 2 TIMES DAILY PRN
Qty: 60 TABLET | Refills: 2 | Status: SHIPPED | OUTPATIENT
Start: 2021-11-17